# Patient Record
Sex: MALE | Race: WHITE | NOT HISPANIC OR LATINO
[De-identification: names, ages, dates, MRNs, and addresses within clinical notes are randomized per-mention and may not be internally consistent; named-entity substitution may affect disease eponyms.]

---

## 2023-04-21 ENCOUNTER — APPOINTMENT (OUTPATIENT)
Dept: AFTER HOURS CARE | Facility: EMERGENCY ROOM | Age: 28
End: 2023-04-21
Payer: MEDICAID

## 2023-04-21 PROBLEM — Z00.00 ENCOUNTER FOR PREVENTIVE HEALTH EXAMINATION: Status: ACTIVE | Noted: 2023-04-21

## 2023-04-21 PROCEDURE — 99204 OFFICE O/P NEW MOD 45 MIN: CPT | Mod: 95

## 2023-04-21 NOTE — ASSESSMENT
[FreeTextEntry1] : 28 yo man with PMHX of migraine headaches, IBS and anxiety presents with feeling weak for the past 3 days with an episode of light headedness today that resolved on its own after he went for a 100 mile bicycle ride this past Sunday.  Patient states that he normally has a low appetite and has not been eating or drinking like he should for someone who exerted himself like he did on Sunday and thinks that could be the problem.  He has no abdominal pain, nausea, vomiting, fever, cough, chest pain or SOB.  He has had no change in BM's, he has no urinary complaints and is urinating normally.  He has no unusual myalgias.  He has no rash or neck pain.  \par \par Assessment:\par \par General weakness likely due to dehydration\par Mom took BP and it was 101/75 and HR was 92 bpm.\par \par \par

## 2023-04-21 NOTE — PLAN
[FreeTextEntry1] : Plan:\par \par Oral rehydration.\par Monitor symptoms for any new or worsening.\par As long as no change or improving, no need to go to ED, but if worse, go to ED for evaluation.\par Otherwise follow up with Dr. Flores on Monday as scheduled.\par

## 2023-04-21 NOTE — PHYSICAL EXAM
[Well Nourished] : well nourished [Well Developed] : well developed [Well-Appearing] : well-appearing [Normal Sclera/Conjunctiva] : normal sclera/conjunctiva [PERRL] : pupils equal round and reactive to light [Normal Outer Ear/Nose] : the outer ears and nose were normal in appearance [No Respiratory Distress] : no respiratory distress  [No Accessory Muscle Use] : no accessory muscle use [No Rash] : no rash [Coordination Grossly Intact] : coordination grossly intact [No Focal Deficits] : no focal deficits [Normal Affect] : the affect was normal [Alert and Oriented x3] : oriented to person, place, and time [Normal Insight/Judgement] : insight and judgment were intact

## 2023-04-21 NOTE — REVIEW OF SYSTEMS
[Fever] : no fever [Chills] : no chills [Fatigue] : fatigue [Hot Flashes] : no hot flashes [Night Sweats] : no night sweats [Recent Change In Weight] : ~T no recent weight change [Dizziness] : dizziness [Anxiety] : anxiety [Negative] : Heme/Lymph [de-identified] : resolved; general weakness

## 2023-04-21 NOTE — HISTORY OF PRESENT ILLNESS
[Home] : at home, [unfilled] , at the time of the visit. [Other Location: e.g. Home (Enter Location, City,State)___] : at [unfilled] [Verbal consent obtained from patient] : the patient, [unfilled] [FreeTextEntry8] : 26 yo man with PMHX of migraine headaches, IBS and anxiety presents with feeling weak for the past 3 days with an episode of light headedness today that resolved on its own after he went for a 100 mile bicycle ride this past Sunday.  Patient states that he normally has a low appetite and has not been eating or drinking like he should for someone who exerted himself like he did on Sunday and thinks that could be the problem.  He has no abdominal pain, nausea, vomiting, fever, cough, chest pain or SOB.  He has had no change in BM's, he has no urinary complaints and is urinating normally.  He has no unusual myalgias.  He has no rash or neck pain.

## 2023-04-23 ENCOUNTER — NON-APPOINTMENT (OUTPATIENT)
Age: 28
End: 2023-04-23